# Patient Record
Sex: FEMALE | Race: BLACK OR AFRICAN AMERICAN | NOT HISPANIC OR LATINO | URBAN - METROPOLITAN AREA
[De-identification: names, ages, dates, MRNs, and addresses within clinical notes are randomized per-mention and may not be internally consistent; named-entity substitution may affect disease eponyms.]

---

## 2019-11-03 ENCOUNTER — INPATIENT (INPATIENT)
Facility: HOSPITAL | Age: 64
LOS: 1 days | Discharge: HOME | DRG: 287 | End: 2019-11-05
Attending: INTERNAL MEDICINE | Admitting: INTERNAL MEDICINE
Payer: MEDICAID

## 2019-11-03 VITALS
HEART RATE: 67 BPM | TEMPERATURE: 98 F | RESPIRATION RATE: 16 BRPM | WEIGHT: 182.98 LBS | HEIGHT: 66 IN | OXYGEN SATURATION: 96 % | SYSTOLIC BLOOD PRESSURE: 140 MMHG | DIASTOLIC BLOOD PRESSURE: 82 MMHG

## 2019-11-03 DIAGNOSIS — Z98.890 OTHER SPECIFIED POSTPROCEDURAL STATES: Chronic | ICD-10-CM

## 2019-11-03 DIAGNOSIS — I21.4 NON-ST ELEVATION (NSTEMI) MYOCARDIAL INFARCTION: ICD-10-CM

## 2019-11-03 DIAGNOSIS — Z90.710 ACQUIRED ABSENCE OF BOTH CERVIX AND UTERUS: Chronic | ICD-10-CM

## 2019-11-03 LAB
ALBUMIN SERPL ELPH-MCNC: 4.4 G/DL — SIGNIFICANT CHANGE UP (ref 3.3–5)
ALP SERPL-CCNC: 83 U/L — SIGNIFICANT CHANGE UP (ref 40–120)
ALT FLD-CCNC: 20 U/L — SIGNIFICANT CHANGE UP (ref 10–45)
ANION GAP SERPL CALC-SCNC: 14 MMOL/L — SIGNIFICANT CHANGE UP (ref 5–17)
APTT BLD: 168.1 SEC — CRITICAL HIGH (ref 27.5–36.3)
APTT BLD: 58.9 SEC — HIGH (ref 27.5–36.3)
AST SERPL-CCNC: 24 U/L — SIGNIFICANT CHANGE UP (ref 10–40)
BILIRUB SERPL-MCNC: 0.8 MG/DL — SIGNIFICANT CHANGE UP (ref 0.2–1.2)
BUN SERPL-MCNC: 13 MG/DL — SIGNIFICANT CHANGE UP (ref 7–23)
CALCIUM SERPL-MCNC: 9.6 MG/DL — SIGNIFICANT CHANGE UP (ref 8.4–10.5)
CHLORIDE SERPL-SCNC: 103 MMOL/L — SIGNIFICANT CHANGE UP (ref 96–108)
CK MB BLD-MCNC: 4.2 % — HIGH (ref 0–3.5)
CK MB BLD-MCNC: 4.5 % — HIGH (ref 0–3.5)
CK MB CFR SERPL CALC: 7 NG/ML — HIGH (ref 0–3.8)
CK MB CFR SERPL CALC: 8.4 NG/ML — HIGH (ref 0–3.8)
CK SERPL-CCNC: 168 U/L — SIGNIFICANT CHANGE UP (ref 25–170)
CK SERPL-CCNC: 185 U/L — HIGH (ref 25–170)
CO2 SERPL-SCNC: 26 MMOL/L — SIGNIFICANT CHANGE UP (ref 22–31)
CREAT SERPL-MCNC: 0.81 MG/DL — SIGNIFICANT CHANGE UP (ref 0.5–1.3)
GLUCOSE SERPL-MCNC: 102 MG/DL — HIGH (ref 70–99)
HCT VFR BLD CALC: 43.5 % — SIGNIFICANT CHANGE UP (ref 34.5–45)
HCT VFR BLD CALC: 45.3 % — HIGH (ref 34.5–45)
HGB BLD-MCNC: 14.8 G/DL — SIGNIFICANT CHANGE UP (ref 11.5–15.5)
HGB BLD-MCNC: 15.1 G/DL — SIGNIFICANT CHANGE UP (ref 11.5–15.5)
INR BLD: 1.08 RATIO — SIGNIFICANT CHANGE UP (ref 0.88–1.16)
MCHC RBC-ENTMCNC: 29.9 PG — SIGNIFICANT CHANGE UP (ref 27–34)
MCHC RBC-ENTMCNC: 30.1 PG — SIGNIFICANT CHANGE UP (ref 27–34)
MCHC RBC-ENTMCNC: 33.3 GM/DL — SIGNIFICANT CHANGE UP (ref 32–36)
MCHC RBC-ENTMCNC: 34 GM/DL — SIGNIFICANT CHANGE UP (ref 32–36)
MCV RBC AUTO: 88.4 FL — SIGNIFICANT CHANGE UP (ref 80–100)
MCV RBC AUTO: 89.7 FL — SIGNIFICANT CHANGE UP (ref 80–100)
NRBC # BLD: 0 /100 WBCS — SIGNIFICANT CHANGE UP (ref 0–0)
NRBC # BLD: 0 /100 WBCS — SIGNIFICANT CHANGE UP (ref 0–0)
PLATELET # BLD AUTO: 395 K/UL — SIGNIFICANT CHANGE UP (ref 150–400)
PLATELET # BLD AUTO: 418 K/UL — HIGH (ref 150–400)
POTASSIUM SERPL-MCNC: 3.6 MMOL/L — SIGNIFICANT CHANGE UP (ref 3.5–5.3)
POTASSIUM SERPL-SCNC: 3.6 MMOL/L — SIGNIFICANT CHANGE UP (ref 3.5–5.3)
PROT SERPL-MCNC: 7.8 G/DL — SIGNIFICANT CHANGE UP (ref 6–8.3)
PROTHROM AB SERPL-ACNC: 12.4 SEC — SIGNIFICANT CHANGE UP (ref 10–12.9)
RBC # BLD: 4.92 M/UL — SIGNIFICANT CHANGE UP (ref 3.8–5.2)
RBC # BLD: 5.05 M/UL — SIGNIFICANT CHANGE UP (ref 3.8–5.2)
RBC # FLD: 13.4 % — SIGNIFICANT CHANGE UP (ref 10.3–14.5)
RBC # FLD: 13.5 % — SIGNIFICANT CHANGE UP (ref 10.3–14.5)
SODIUM SERPL-SCNC: 143 MMOL/L — SIGNIFICANT CHANGE UP (ref 135–145)
TROPONIN T, HIGH SENSITIVITY RESULT: 50 NG/L — SIGNIFICANT CHANGE UP (ref 0–51)
TROPONIN T, HIGH SENSITIVITY RESULT: 58 NG/L — HIGH (ref 0–51)
WBC # BLD: 6.72 K/UL — SIGNIFICANT CHANGE UP (ref 3.8–10.5)
WBC # BLD: 8.53 K/UL — SIGNIFICANT CHANGE UP (ref 3.8–10.5)
WBC # FLD AUTO: 6.72 K/UL — SIGNIFICANT CHANGE UP (ref 3.8–10.5)
WBC # FLD AUTO: 8.53 K/UL — SIGNIFICANT CHANGE UP (ref 3.8–10.5)

## 2019-11-03 PROCEDURE — 93010 ELECTROCARDIOGRAM REPORT: CPT

## 2019-11-03 PROCEDURE — 71045 X-RAY EXAM CHEST 1 VIEW: CPT | Mod: 26

## 2019-11-03 PROCEDURE — 70450 CT HEAD/BRAIN W/O DYE: CPT | Mod: 26

## 2019-11-03 PROCEDURE — 99254 IP/OBS CNSLTJ NEW/EST MOD 60: CPT | Mod: GC

## 2019-11-03 PROCEDURE — 99291 CRITICAL CARE FIRST HOUR: CPT

## 2019-11-03 RX ORDER — HEPARIN SODIUM 5000 [USP'U]/ML
INJECTION INTRAVENOUS; SUBCUTANEOUS
Qty: 25000 | Refills: 0 | Status: DISCONTINUED | OUTPATIENT
Start: 2019-11-03 | End: 2019-11-04

## 2019-11-03 RX ORDER — ASPIRIN/CALCIUM CARB/MAGNESIUM 324 MG
81 TABLET ORAL DAILY
Refills: 0 | Status: DISCONTINUED | OUTPATIENT
Start: 2019-11-03 | End: 2019-11-05

## 2019-11-03 RX ORDER — METOPROLOL TARTRATE 50 MG
25 TABLET ORAL
Refills: 0 | Status: DISCONTINUED | OUTPATIENT
Start: 2019-11-03 | End: 2019-11-05

## 2019-11-03 RX ORDER — HEPARIN SODIUM 5000 [USP'U]/ML
4700 INJECTION INTRAVENOUS; SUBCUTANEOUS EVERY 6 HOURS
Refills: 0 | Status: DISCONTINUED | OUTPATIENT
Start: 2019-11-03 | End: 2019-11-04

## 2019-11-03 RX ORDER — ACETAMINOPHEN 500 MG
650 TABLET ORAL EVERY 6 HOURS
Refills: 0 | Status: DISCONTINUED | OUTPATIENT
Start: 2019-11-03 | End: 2019-11-05

## 2019-11-03 RX ORDER — PANTOPRAZOLE SODIUM 20 MG/1
40 TABLET, DELAYED RELEASE ORAL
Refills: 0 | Status: DISCONTINUED | OUTPATIENT
Start: 2019-11-03 | End: 2019-11-05

## 2019-11-03 RX ADMIN — HEPARIN SODIUM 950 UNIT(S)/HR: 5000 INJECTION INTRAVENOUS; SUBCUTANEOUS at 15:06

## 2019-11-03 RX ADMIN — HEPARIN SODIUM 950 UNIT(S)/HR: 5000 INJECTION INTRAVENOUS; SUBCUTANEOUS at 19:38

## 2019-11-03 RX ADMIN — Medication 25 MILLIGRAM(S): at 17:17

## 2019-11-03 NOTE — PROVIDER CONTACT NOTE (OTHER) - SITUATION
Pt. complained of feeling light headed and left eye bleeding. Pt on heparin gtt at 9.5 ml/hr hour- heparin gtt on hold. Pt. denies any chest pain or shortness of breath. Pt with no events on tele.

## 2019-11-03 NOTE — ED PROVIDER NOTE - PMH
Hypertension, unspecified type Hyperlipidemia, unspecified hyperlipidemia type    Hypertension, unspecified type    Varicose veins

## 2019-11-03 NOTE — ED PROVIDER NOTE - PHYSICAL EXAMINATION
GENERAL: NAD  HEENT: PERRL, MMM, no oropharyngeal lesions or erythema appreciated  Pulm: normal work of breathing, CTABL  CV: RRR, S1&S2+, no m/r/g appreciated  ABDOMEN: soft, nt, nd, no hepatosplenomegaly, BS+  MSK: nl ROM  EXTREMITIES:  1+ pitting edema in b/l LE  Neuro: A&Ox3, no focal deficits  SKIN: warm and dry, no visible rash

## 2019-11-03 NOTE — ED PROVIDER NOTE - ATTENDING CONTRIBUTION TO CARE
64F, pmh htn, hld, s/p hysterecomy, presents as transfer from OSH for chest pain. Pt with onset chest pressure yetserday, rad to L arm, seen at [facility] ED, found to have elevated trop, std's on EKG. cards at [facility] consulted, pt given asa, plavix, and strarted on heparin gtt. upon arrival to [facility] pt asymptomatic, denying cp, sob, palpitations, abd pain, or other complaints.    PE: NAD, NCAT, MMM, Trachea midline, Normal conjunctiva, lungs CTAB, S1/S2 RRR, Normal perfusion, 2+ radial pulses bilat, Abdomen Soft, NTND, No rebound/guarding, No LE edema, No deformity of extremities, No rashes,  No focal motor or sensory deficits.     EKG reveals std's as noted concerning for ischemia. Without s/s of PE, without sx suggestive of aortic pathology. Cards consulted on arrival. To send labs includign repeat trop. To be admitted to tele. - Manolo Jin MD

## 2019-11-03 NOTE — ED PROVIDER NOTE - OBJECTIVE STATEMENT
63 y/o woman with pmhx HTN, HLD, fibroids s/p hysterectomy transferred from OSH for chest pain. Last night, chest pain (pressure-like) began with radiation to the left arm, associated with activity (styling sister's hair.) Has SOB with exertion. Arrived at [facility] ED this morning, s/p  and SLN x1 with resolution of symptoms, trop was 0.04. loaded with Plavix 600 and heparin prior to transfer to [facility]. Here, patient asymptomatic, denies chest pain, SOB, orthopnea, fever, chills, edema, palpitations. Reportedly she had a stress test 3/2019 in [facility] ([facility]) and told she had blockages, but there was no intervention. Repeat troponin 50, suspect NSTEMI. Cardiology planning for cath 11/4/19.

## 2019-11-03 NOTE — ED ADULT NURSE NOTE - NSIMPLEMENTINTERV_GEN_ALL_ED
Implemented All Fall with Harm Risk Interventions:  [place] to call system. Call bell, personal items and telephone within reach. Instruct patient to call for assistance. Room bathroom lighting operational. Non-slip footwear when patient is off stretcher. Physically safe environment: no spills, clutter or unnecessary equipment. Stretcher in lowest position, wheels locked, appropriate side rails in place. Provide visual cue, wrist band, yellow gown, etc. Monitor gait and stability. Monitor for mental status changes and reorient to person, place, and time. Review medications for side effects contributing to fall risk. Reinforce activity limits and safety measures with patient and family. Provide visual clues: red socks.

## 2019-11-03 NOTE — CONSULT NOTE ADULT - SUBJECTIVE AND OBJECTIVE BOX
Patient seen and evaluated at bedside    Chief Complaint:    HPI:  64F hx HTN, HLD, and cardiomegaly (per verbal report) states having chest pain since midnight  Notes she was doing her sisters hair at midnight and felt central chest pressure radiating to left arm. Associated with SOB. Coming and going. Presented to [facility] ED this AM.  Given  and SLN x1 with resolution of Symptoms. Trop I was 0.04 (mildly positive). I gave permission to load with Plavix 600 and heparin prior to transfer.  upon arrival symptoms gone. She never had before. States SOB is only with CP, denies orthopnea, fever, chills, edema, palpitations.  Notes she had a stress in March in [place] and told she had blockages, but nothing was done?    Of note, 'cardiomegaly' history started 2 years ago after death of both her sons due to non-medical reasons (motor accident). Denies prior cardiac workup.    PMHx:   HTN  HLD  cardiomegaly    PSHx:   vein stripping  hernia    Allergies:  penicillin (Unknown)      Home Meds:  Amlodipine  prindopril  indipamide      FAMILY HISTORY:  Denied      Social History:  Smoking History: Denied  Alcohol Use: Denied  Drug Use: Denied    REVIEW OF SYSTEMS:  CONSTITUTIONAL: No weakness, fevers or chills  EYES/ENT: No visual changes;  No dysphagia  NECK: No pain or stiffness  RESPIRATORY: as hpi  CARDIOVASCULAR: as above  GASTROINTESTINAL: No abdominal or epigastric pain. No nausea, vomiting, or hematemesis; No diarrhea or constipation. No melena or hematochezia.  BACK: No back pain  GENITOURINARY: No dysuria, frequency or hematuria  NEUROLOGICAL: No numbness or weakness  SKIN: No itching, burning, rashes, or lesions   All other review of systems is negative unless indicated above.    Physical Exam:  T(F): 98 (11-03), Max: 98 (11-03)  HR: 74 (11-03) (67 - 74)  BP: 116/91 (11-03) (116/91 - 140/82)  RR: 16 (11-03)  SpO2: 98% (11-03)  GENERAL: No acute distress, well-developed  HEAD:  Atraumatic, Normocephalic  ENT: EOMI, PERRLA, conjunctiva and sclera clear, Neck supple, No JVD, moist mucosa  CHEST/LUNG: Clear to auscultation bilaterally; No wheeze, equal breath sounds bilaterally   BACK: No spinal tenderness  HEART: Regular rate and rhythm; No murmurs, rubs, or gallops  ABDOMEN: Soft, Nontender, Nondistended; Bowel sounds present  EXTREMITIES:  No clubbing, cyanosis, or edema  PSYCH: Nl behavior, nl affect  NEUROLOGY: AAOx3, non-focal, cranial nerves intact  SKIN: Normal color, No rashes or lesions    Cardiovascular Diagnostic Testing:    ECG: Personally reviewed:  LVH with 1mm STD in lateral lead (likely repol, cannot rule out ischemia)    Labs: Personally reviewed                        15.1   6.72  )-----------( 418      ( 03 Nov 2019 13:05 )             45.3 64F hx HTN, HLD, and cardiomegaly (per verbal report); states having chest pain since midnight  Notes she was doing her sisters hair at midnight and felt central chest pressure radiating to left arm. Associated with SOB.  Sxs. waxed and waned; presented to [facility] ED this AM.  She was given  and SLN x1 with resolution of symptoms. Trop I was 0.04 (mildly positive). I gave permission to load with Plavix 600 and heparin prior to transfer here.  Upon arrival to [facility], symptoms had resolved.  She never had similar sxs. before. States SOB is only with CP, denies orthopnea, fever, chills, edema, palpitations.  Notes she had a stress in March in [place] and told she had blockages, but nothing was done?    Of note, 'cardiomegaly' history started 2 years ago after death of both her sons, due to non-medical reasons (motor accident). Denies prior cardiac workup.      PMHx:   HTN  HLD  cardiomegaly    PSHx:   vein stripping  hernia    Allergies:  penicillin (Unknown)    Home Meds:  amlodipine  prindopril  indipamide      FAMILY HISTORY:  No family hx of heart disease    Social History:  Smoking History: Denied  Alcohol Use: Denied  Drug Use: Denied    REVIEW OF SYSTEMS:  CONSTITUTIONAL: No weakness, fevers or chills  EYES/ENT: No visual changes;  No dysphagia  NECK: No pain or stiffness  RESPIRATORY: as hpi  CARDIOVASCULAR: as above  GASTROINTESTINAL: No abdominal or epigastric pain. No nausea, vomiting, or hematemesis; No diarrhea or constipation. No melena or hematochezia.  BACK: No back pain  GENITOURINARY: No dysuria, frequency or hematuria  NEUROLOGICAL: No numbness or weakness  SKIN: No itching, burning, rashes, or lesions   All other review of systems is negative unless indicated above.    Physical Exam:  T(F): 98 (11-03), Max: 98 (11-03)  HR: 74 (11-03) (67 - 74)  BP: 116/91 (11-03) (116/91 - 140/82)  RR: 16 (11-03)  SpO2: 98% (11-03)    GENERAL: No acute distress, well-developed  HEAD:  Atraumatic, Normocephalic  ENT: EOMI, PERRLA, conjunctiva and sclera clear, Neck supple, No JVD, moist mucosa  CHEST/LUNG: Clear to auscultation bilaterally; No wheeze, equal breath sounds bilaterally   BACK: No spinal tenderness  HEART: Regular rate and rhythm; No murmurs, rubs, or gallops  ABDOMEN: Soft, Nontender, Nondistended; Bowel sounds present  EXTREMITIES:  No clubbing, cyanosis, or edema  PSYCH: Nl behavior, nl affect  NEUROLOGY: AAOx3, non-focal, cranial nerves intact  SKIN: Normal color, No rashes or lesions      ECG:  LVH with 1mm STD in lateral lead (likely repol, cannot rule out ischemia)    Labs:                       15.1   6.72  )-----------( 418      ( 03 Nov 2019 13:05 )             45.3

## 2019-11-03 NOTE — ED PROVIDER NOTE - CARE PLAN
Principal Discharge DX:	NSTEMI (non-ST elevated myocardial infarction)  Goal:	Treatment, evaluation  Assessment and plan of treatment:	Pt with t-wave inversions on EKG V3-V6, trop 50<<0.04, CKMB 8.4, cardiology reccs non-emergent cath tomorrow 11/4 AM. Admit to telemetry for monitoring.

## 2019-11-03 NOTE — PROVIDER CONTACT NOTE (OTHER) - ACTION/TREATMENT ORDERED:
NP made aware- as per NP- hold heparin gtt, draw STAT CBC and PTT. STAT CT of the head ordered. Continue to monitor pt.

## 2019-11-03 NOTE — ED ADULT NURSE NOTE - OBJECTIVE STATEMENT
64 year old female BIBEMS from [facility] for chest pain and cardiology. Pt was given 325 ASA ad 0922, brillinta 180 at 1102, one dose of nitro at 0922, heparin 5,000 IV bolus at 11:04 and iv DOSE OF 1,000 UNITS an hour started at 1105. Heparin discontinued on arrival at 1212 as per MD Maier and to be reordered based on measured weight. Pt is A&O x 4, VSS, afebrile, ambulating independently. Pt denies fever, chills, NVD. Pt has 20G IV in right AC from [facility], absent of SS of infiltration. Pt denies CP on arrival, EKG completed and given to MD Maier, Pt placed on CM. pt resting comfortably. EKG via EMS showing T wave inversions and minor depressions. Pt has Hc of enlarged heart and HTN.  Pt trope at [facility] was .045 64 year old female BIBEMS from [facility] for chest pain and cardiology. Pt was given 325 ASA ad 0922, brillinta 180 at 1102, one dose of nitro at 0922, heparin 5,000 IV bolus at 11:04 and iv DOSE OF 1,000 UNITS an hour started at 1105. Heparin discontinued on arrival at 1212 as per MD Maier and to be reordered based on measured weight. Pt is A&O x 4, VSS, afebrile, ambulating independently. Pt denies fever, chills, NVD. Pt has 20G IV in right AC from [facility], absent of SS of infiltration. Pt denies CP on arrival, EKG completed and given to MD Maier, Pt placed on CM. pt resting comfortably. EKG via EMS showing T wave inversions and minor depressions. Pt has Hc of enlarged heart and HTN.  Pt trop at [facility] was .045

## 2019-11-03 NOTE — ED PROVIDER NOTE - PROGRESS NOTE DETAILS
Cardiology reccs appreciated, pending non-emergent cardiac cath for 11/4/19. Admit to telemetry for monitoring.

## 2019-11-03 NOTE — H&P ADULT - NSHPSOCIALHISTORY_GEN_ALL_CORE
Social History:    Marital Status:  (  x )    (   ) Single    (   )    (  )   Occupation:   Lives with: (  ) alone  (  ) children   ( x ) spouse   (  ) parents  (  ) other    Substance Use (street drugs): ( x ) never used  (  ) other:  Tobacco Usage:  ( x  ) never smoked   (   ) former smoker   (   ) current smoker  (     ) pack years  (        ) last cigarette date  Alcohol Usage: denies    (     ) Advanced Directives: (     ) None    (      ) DNR    (     ) DNI    (     ) Health Care Proxy:

## 2019-11-03 NOTE — H&P ADULT - NSHPREVIEWOFSYSTEMS_GEN_ALL_CORE
REVIEW OF SYSTEMS:    CONSTITUTIONAL: No weakness, fevers or chills  EYES/ENT: No visual changes;  No vertigo or throat pain   NECK: No pain or stiffness  RESPIRATORY: No cough, wheezing, hemoptysis; + shortness of breath  CARDIOVASCULAR: + chest pain or palpitations  GASTROINTESTINAL: No abdominal or epigastric pain. No nausea, vomiting, or hematemesis; No diarrhea or constipation. No melena or hematochezia.  GENITOURINARY: No dysuria, frequency or hematuria  NEUROLOGICAL: No numbness or weakness  SKIN: No itching, burning, rashes, or lesions   All other review of systems is negative unless indicated above.

## 2019-11-03 NOTE — H&P ADULT - NSHPPHYSICALEXAM_GEN_ALL_CORE
PHYSICAL EXAMINATION:  Vital Signs Last 24 Hrs  T(C): 36.7 (03 Nov 2019 12:45), Max: 36.7 (03 Nov 2019 12:45)  T(F): 98 (03 Nov 2019 12:45), Max: 98 (03 Nov 2019 12:45)  HR: 74 (03 Nov 2019 12:45) (67 - 74)  BP: 116/91 (03 Nov 2019 12:45) (116/91 - 140/82)  BP(mean): --  RR: 16 (03 Nov 2019 12:45) (16 - 16)  SpO2: 98% (03 Nov 2019 12:45) (96% - 98%)  CAPILLARY BLOOD GLUCOSE          GENERAL: NAD, well-groomed, well-developed  HEAD:  atraumatic, normocephalic  EYES: sclera anicteric  ENMT: mucous membranes moist  NECK: supple, No JVD  CHEST/LUNG: clear to auscultation bilaterally; no rales, rhonchi, or wheezing b/l  HEART: normal S1, S2  ABDOMEN: BS+, soft, ND, NT   EXTREMITIES:  pulses palpable; no clubbing, cyanosis, or edema b/l LEs  NEURO: awake, alert, interactive; moves all extremities  SKIN: no rashes or lesions

## 2019-11-03 NOTE — PROVIDER CONTACT NOTE (OTHER) - BACKGROUND
Pt. admitted for chest pain, and shortness of breath. Pt SR on tele. Pt. awaiting TTE and cardiac cath.

## 2019-11-03 NOTE — CONSULT NOTE ADULT - ASSESSMENT
64F hx HTN, HLD, non-smoker and cardiomegaly (per verbal report) states having chest pain since midnight    #UA vs NSTEMI: Trop I was 0.04, would repeat hsTrop while here.   -hsTrop trend x2, with CKMB, BNP  -TTE given cardiomegaly history  - given, continue ASA 81  -If hsTrop negative, no need to continue plavix/hep  -If hsTrop is positive, please continue daily plavix and heparin gtt  -Would recommend non-urgent LHC on Monday  -No signs of CHF, arrhythmia, or active ischemia (other than mild trop)    Discussed with Dr. Saldivar    For all cardiology related questions, please call the current cardiology fellow on service at this time.  ** Please go to [email or web address] ; login: [email or web address] (case-sensitive) **    Ramakrishna Urena MD  Department of Cardiovascular Disease 64F hx HTN, HLD, non-smoker and cardiomegaly (per verbal report) states having chest pain since midnight    #UA vs NSTEMI: Trop I was 0.04, would repeat hsTrop while here. STEPHEN Score 89  -hsTrop trend x2, with CKMB, BNP (CK elevated, check CKMB)  -TTE given cardiomegaly history  - given, continue ASA 81  -First hsTrop 50, please repeat in 3 hours. Okay to continue Plavix/heparin for now given symptoms may be due to unstable angina.  -Would recommend non-urgent LHC on Monday  -No signs of CHF, arrhythmia, or active ischemia (other than mild trop)    Discussed with Dr. Saldivar    For all cardiology related questions, please call the current cardiology fellow on service at this time.  ** Please go to [email or web address] ; login: [email or web address] (case-sensitive) **    Ramakrishna Urena MD  Department of Cardiovascular Disease 64F hx HTN, HLD, non-smoker and cardiomegaly (per verbal report) states having chest pain since midnight    #UA vs NSTEMI: Trop I was 0.04, would repeat hsTrop while here. STEPHEN Score 89  -hsTrop trend x2, with CKMB, BNP (CK elevated, check CKMB)  -TTE given cardiomegaly history  - given, continue ASA 81  -First hsTrop 50, please repeat in 3 hours. If rising, okay to continue Plavix/heparin. Otherwise, mild trop may be due to HTN and LVH (BP was in 160s in [facility] per signout) (please control BP)  -Would recommend non-urgent LHC on Monday  -No signs of CHF, arrhythmia, or active ischemia (other than mild trop)  -Start metop PO if BP remains elevated    Discussed with Dr. Saldivar    For all cardiology related questions, please call the current cardiology fellow on service at this time.  ** Please go to [email or web address] ; login: [email or web address] (case-sensitive) **    Ramakrishna Urena MD  Department of Cardiovascular Disease 64F hx HTN, HLD, non-smoker and cardiomegaly (per verbal report), hx. of abnormal stress test, presents with complain ot CP since midnight.      REC:  #UA vs NSTEMI: Trop I was 0.04, would repeat hsTrop while here. STEPHEN Score 89  -hsTrop trend x2, with CKMB, BNP (CK elevated, check CKMB)  -TTE given cardiomegaly history  - given, continue ASA 81  -First hsTrop 50, please repeat in 3 hours. If rising, okay to continue Plavix/heparin. Otherwise, mild trop may be due to HTN and LVH (BP was in 160s in [facility] per signout) (please control BP)  -Recommend non-urgent LHC on Monday  -No signs of CHF, arrhythmia, or active ischemia (other than mild trop)  -Start metop PO if BP remains elevated    Discussed with Dr. Yariel Urena MD  Cardiology fellow  Department of Cardiovascular Disease    Raudel Hoover M.D.  Cardiology Attending, Consult Service  [phone] or beeper     For all Cardiology service contact information, go to [email or web address] and use "[email or web address]" to login.

## 2019-11-03 NOTE — CHART NOTE - NSCHARTNOTEFT_GEN_A_CORE
Called by RN to see pt for dizziness/ bleeding by eye. 63 y/o woman with pmhx HTN, HLD, fibroids s/p hysterectomy transferred from OSH for chest pain. Given , loaded with Plavix and SLN x1 with resolution of Symptoms. Trop was  (mildly positive) Admitted with CP-NSTEMI 11/3 , likely plan for cath in am. pt seen and examined.     Vital Signs Last 24 Hrs  T(C): 37 (03 Nov 2019 17:13), Max: 37 (03 Nov 2019 17:13)  T(F): 98.6 (03 Nov 2019 17:13), Max: 98.6 (03 Nov 2019 17:13)  HR: 64 (03 Nov 2019 17:13) (64 - 74)  BP: 150/85 (03 Nov 2019 17:13) (116/91 - 157/89)  BP(mean): --  RR: 18 (03 Nov 2019 17:13) (16 - 18)  SpO2: 94% (03 Nov 2019 17:13) (94% - 98%)                        15.1   6.72  )-----------( 418      ( 03 Nov 2019 13:05 )             45.3     11-03    143  |  103  |  13  ----------------------------<  102<H>  3.6   |  26  |  0.81    Ca    9.6      03 Nov 2019 13:05    TPro  7.8  /  Alb  4.4  /  TBili  0.8  /  DBili  x   /  AST  24  /  ALT  20  /  AlkPhos  83  11-03    PT/INR - ( 03 Nov 2019 13:05 )   PT: 12.4 sec;   INR: 1.08 ratio         PTT - ( 03 Nov 2019 13:05 )  PTT:168.1 sec    General: WN/WD NAD  Neurology: A&Ox3, nonfocal, DEL CID x 4  Head:  Normocephalic, atraumatic  eyes- negative bleeding in eyes b/l, + scratch by left eye lid minor bleeding  Respiratory: CTA B/L  CV: RRR, S1S2, no murmur  Abdominal: Soft, NT, ND no palpable mass  MSK: No edema, + peripheral pulses, FROM all 4 extremity    A/P  ACS  On heparin gtt, possibly supra theraputic  Hold heparin gtt for now  check stat labs  given dizziness will also check CT head to r/o bleed  F/U results  D/W Dr Cruz Called by RN to see pt for dizziness and " bleeding by eye". 63 y/o woman with pmhx HTN, HLD, fibroids s/p hysterectomy transferred from OSH for chest pain. Given , loaded with Plavix and SLN x1 with resolution of Symptoms. Trop was  (mildly positive) Admitted with CP-NSTEMI 11/3 , likely plan for cath in am. pt seen and examined.     Vital Signs Last 24 Hrs  T(C): 37 (03 Nov 2019 17:13), Max: 37 (03 Nov 2019 17:13)  T(F): 98.6 (03 Nov 2019 17:13), Max: 98.6 (03 Nov 2019 17:13)  HR: 64 (03 Nov 2019 17:13) (64 - 74)  BP: 150/85 (03 Nov 2019 17:13) (116/91 - 157/89)  BP(mean): --  RR: 18 (03 Nov 2019 17:13) (16 - 18)  SpO2: 94% (03 Nov 2019 17:13) (94% - 98%)                        15.1   6.72  )-----------( 418      ( 03 Nov 2019 13:05 )             45.3     11-03    143  |  103  |  13  ----------------------------<  102<H>  3.6   |  26  |  0.81    Ca    9.6      03 Nov 2019 13:05    TPro  7.8  /  Alb  4.4  /  TBili  0.8  /  DBili  x   /  AST  24  /  ALT  20  /  AlkPhos  83  11-03    PT/INR - ( 03 Nov 2019 13:05 )   PT: 12.4 sec;   INR: 1.08 ratio         PTT - ( 03 Nov 2019 13:05 )  PTT:168.1 sec    General: WN/WD NAD  Neurology: A&Ox3, nonfocal, DEL CID x 4  Head:  Normocephalic, atraumatic  eyes- negative bleeding in eyes, + scratch by inner canthus of left eye + minor bleeding  Respiratory: CTA B/L  CV: RRR, S1S2, no murmur  Abdominal: Soft, NT, ND no palpable mass  MSK: No edema, + peripheral pulses, FROM all 4 extremity    A/P  ACS  On heparin gtt, possibly supra theraputic  Hold heparin gtt for now  check stat labs  given dizziness will also check CT head to r/o bleed  F/U results  D/W Dr Cruz

## 2019-11-03 NOTE — H&P ADULT - ASSESSMENT
64 f with    Chest pain/ R/O MI  - telemetry  - cardiac enzymes  - ASA, BB  - AC with Heparin gtt  - cardiology evaluation    HTN control    DVT/ GI prophylaxis    Further action as per clinical course   Kamlesh Cruz MD pager [phone]

## 2019-11-03 NOTE — ED PROVIDER NOTE - CLINICAL SUMMARY MEDICAL DECISION MAKING FREE TEXT BOX
Pt with t-wave inversions on EKG V3-V6, trop 50<<0.04, CKMB 8.4, cardiology reccs non-emergent cath tomorrow 11/4 AM. Trend troponin/CKMB next due at 4pm, ordered. Admit to telemetry for monitoring. Pt with t-wave inversions on EKG V3-V6, trop elevated, CKMB 8.4, cardiology reccs non-emergent cath tomorrow 11/4 AM. Trend troponin/CKMB next due at 4pm, ordered. Admit to telemetry for monitoring.

## 2019-11-03 NOTE — H&P ADULT - HISTORY OF PRESENT ILLNESS
63 y/o woman with pmhx HTN, HLD, fibroids s/p hysterectomy transferred from OSH for chest pain. Last night, chest pain (pressure-like) began with radiation to the left arm, associated with activity (styling sister's hair.) Has SOB with exertion. Arrived at [facility] ED this morning, s/p  and SLN x1 with resolution of symptoms, trop was 0.04. loaded with Plavix 600 and heparin prior to transfer to [facility]. Here, patient asymptomatic, denies chest pain, SOB, orthopnea, fever, chills, edema, palpitations. Reportedly she had a stress test 3/2019 in [facility] ([facility]) and told she had blockages, but there was no intervention. Repeat troponin 50, suspect NSTEMI. Cardiology planning for cath 11/4/19.

## 2019-11-03 NOTE — H&P ADULT - NSHPLABSRESULTS_GEN_ALL_CORE
15.1   6.72  )-----------( 418      ( 03 Nov 2019 13:05 )             45.3       11-03    143  |  103  |  13  ----------------------------<  102<H>  3.6   |  26  |  0.81    Ca    9.6      03 Nov 2019 13:05    TPro  7.8  /  Alb  4.4  /  TBili  0.8  /  DBili  x   /  AST  24  /  ALT  20  /  AlkPhos  83  11-03                  PT/INR - ( 03 Nov 2019 13:05 )   PT: 12.4 sec;   INR: 1.08 ratio         PTT - ( 03 Nov 2019 13:05 )  PTT:168.1 sec    Lactate Trend      CARDIAC MARKERS ( 03 Nov 2019 13:05 )  x     / x     / 185 U/L / x     / 8.4 ng/mL        CAPILLARY BLOOD GLUCOSE        EKG SR T inv inf/ ant leads  < from: Xray Chest 1 View- PORTABLE-Urgent (11.03.19 @ 13:20) >    IMPRESSION:    Clear lungs. No pleural effusion or pneumothorax.    < end of copied text >

## 2019-11-03 NOTE — ED PROVIDER NOTE - PLAN OF CARE
Treatment, evaluation Pt with t-wave inversions on EKG V3-V6, trop 50<<0.04, CKMB 8.4, cardiology reccs non-emergent cath tomorrow 11/4 AM. Admit to telemetry for monitoring.

## 2019-11-03 NOTE — H&P ADULT - NSICDXPASTMEDICALHX_GEN_ALL_CORE_FT
PAST MEDICAL HISTORY:  Hyperlipidemia, unspecified hyperlipidemia type     Hypertension, unspecified type     Varicose veins

## 2019-11-04 LAB
ANION GAP SERPL CALC-SCNC: 13 MMOL/L — SIGNIFICANT CHANGE UP (ref 5–17)
APTT BLD: 62.4 SEC — HIGH (ref 27.5–36.3)
BUN SERPL-MCNC: 14 MG/DL — SIGNIFICANT CHANGE UP (ref 7–23)
CALCIUM SERPL-MCNC: 9.2 MG/DL — SIGNIFICANT CHANGE UP (ref 8.4–10.5)
CHLORIDE SERPL-SCNC: 102 MMOL/L — SIGNIFICANT CHANGE UP (ref 96–108)
CO2 SERPL-SCNC: 25 MMOL/L — SIGNIFICANT CHANGE UP (ref 22–31)
CREAT SERPL-MCNC: 0.88 MG/DL — SIGNIFICANT CHANGE UP (ref 0.5–1.3)
GLUCOSE SERPL-MCNC: 93 MG/DL — SIGNIFICANT CHANGE UP (ref 70–99)
HCT VFR BLD CALC: 41.3 % — SIGNIFICANT CHANGE UP (ref 34.5–45)
HCT VFR BLD CALC: 42 % — SIGNIFICANT CHANGE UP (ref 34.5–45)
HCV AB S/CO SERPL IA: 0.13 S/CO — SIGNIFICANT CHANGE UP (ref 0–0.99)
HCV AB SERPL-IMP: SIGNIFICANT CHANGE UP
HGB BLD-MCNC: 13.7 G/DL — SIGNIFICANT CHANGE UP (ref 11.5–15.5)
HGB BLD-MCNC: 14 G/DL — SIGNIFICANT CHANGE UP (ref 11.5–15.5)
MCHC RBC-ENTMCNC: 30 PG — SIGNIFICANT CHANGE UP (ref 27–34)
MCHC RBC-ENTMCNC: 30.5 PG — SIGNIFICANT CHANGE UP (ref 27–34)
MCHC RBC-ENTMCNC: 32.6 GM/DL — SIGNIFICANT CHANGE UP (ref 32–36)
MCHC RBC-ENTMCNC: 33.9 GM/DL — SIGNIFICANT CHANGE UP (ref 32–36)
MCV RBC AUTO: 90 FL — SIGNIFICANT CHANGE UP (ref 80–100)
MCV RBC AUTO: 92.1 FL — SIGNIFICANT CHANGE UP (ref 80–100)
NRBC # BLD: 0 /100 WBCS — SIGNIFICANT CHANGE UP (ref 0–0)
PLATELET # BLD AUTO: 353 K/UL — SIGNIFICANT CHANGE UP (ref 150–400)
PLATELET # BLD AUTO: 376 K/UL — SIGNIFICANT CHANGE UP (ref 150–400)
POTASSIUM SERPL-MCNC: 3.5 MMOL/L — SIGNIFICANT CHANGE UP (ref 3.5–5.3)
POTASSIUM SERPL-SCNC: 3.5 MMOL/L — SIGNIFICANT CHANGE UP (ref 3.5–5.3)
RBC # BLD: 4.56 M/UL — SIGNIFICANT CHANGE UP (ref 3.8–5.2)
RBC # BLD: 4.59 M/UL — SIGNIFICANT CHANGE UP (ref 3.8–5.2)
RBC # FLD: 13.6 % — SIGNIFICANT CHANGE UP (ref 10.3–14.5)
RBC # FLD: 13.6 % — SIGNIFICANT CHANGE UP (ref 10.3–14.5)
SODIUM SERPL-SCNC: 140 MMOL/L — SIGNIFICANT CHANGE UP (ref 135–145)
TROPONIN T, HIGH SENSITIVITY RESULT: 50 NG/L — SIGNIFICANT CHANGE UP (ref 0–51)
WBC # BLD: 7.07 K/UL — SIGNIFICANT CHANGE UP (ref 3.8–10.5)
WBC # BLD: 8.51 K/UL — SIGNIFICANT CHANGE UP (ref 3.8–10.5)
WBC # FLD AUTO: 7.07 K/UL — SIGNIFICANT CHANGE UP (ref 3.8–10.5)
WBC # FLD AUTO: 8.51 K/UL — SIGNIFICANT CHANGE UP (ref 3.8–10.5)

## 2019-11-04 PROCEDURE — 93458 L HRT ARTERY/VENTRICLE ANGIO: CPT | Mod: 26,GC

## 2019-11-04 PROCEDURE — 99233 SBSQ HOSP IP/OBS HIGH 50: CPT | Mod: GC

## 2019-11-04 RX ORDER — INFLUENZA VIRUS VACCINE 15; 15; 15; 15 UG/.5ML; UG/.5ML; UG/.5ML; UG/.5ML
0.5 SUSPENSION INTRAMUSCULAR ONCE
Refills: 0 | Status: COMPLETED | OUTPATIENT
Start: 2019-11-04 | End: 2019-11-05

## 2019-11-04 RX ADMIN — Medication 25 MILLIGRAM(S): at 05:30

## 2019-11-04 RX ADMIN — PANTOPRAZOLE SODIUM 40 MILLIGRAM(S): 20 TABLET, DELAYED RELEASE ORAL at 05:30

## 2019-11-04 RX ADMIN — Medication 81 MILLIGRAM(S): at 11:28

## 2019-11-04 RX ADMIN — HEPARIN SODIUM 950 UNIT(S)/HR: 5000 INJECTION INTRAVENOUS; SUBCUTANEOUS at 02:22

## 2019-11-04 RX ADMIN — Medication 25 MILLIGRAM(S): at 18:24

## 2019-11-04 NOTE — PROGRESS NOTE ADULT - SUBJECTIVE AND OBJECTIVE BOX
Patient is a 64y old  Female who presents with a chief complaint of chest pain (04 Nov 2019 10:26)      SUBJECTIVE / OVERNIGHT EVENTS: Comfortable without new complaints.   Review of Systems  chest pain no  palpitations no  sob no  nausea no  headache no    MEDICATIONS  (STANDING):  aspirin  chewable 81 milliGRAM(s) Oral daily  influenza   Vaccine 0.5 milliLiter(s) IntraMuscular once  metoprolol tartrate 25 milliGRAM(s) Oral two times a day  pantoprazole    Tablet 40 milliGRAM(s) Oral before breakfast    MEDICATIONS  (PRN):  acetaminophen   Tablet .. 650 milliGRAM(s) Oral every 6 hours PRN Temp greater or equal to 38.5C (101.3F), Mild Pain (1 - 3)      Vital Signs Last 24 Hrs  T(C): 36.6 (04 Nov 2019 18:17), Max: 37.2 (03 Nov 2019 20:22)  T(F): 97.9 (04 Nov 2019 18:17), Max: 98.9 (03 Nov 2019 20:22)  HR: 74 (04 Nov 2019 18:17) (63 - 74)  BP: 133/78 (04 Nov 2019 18:17) (113/71 - 153/93)  BP(mean): --  RR: 18 (04 Nov 2019 18:17) (18 - 18)  SpO2: 96% (04 Nov 2019 18:17) (96% - 100%)    PHYSICAL EXAM:  GENERAL: NAD, well-developed  HEAD:  Atraumatic, Normocephalic  EYES: EOMI, PERRLA, conjunctiva and sclera clear  NECK: Supple, No JVD  CHEST/LUNG: Clear to auscultation bilaterally; No wheeze  HEART: Regular rate and rhythm; No murmurs, rubs, or gallops  ABDOMEN: Soft, Nontender, Nondistended; Bowel sounds present  EXTREMITIES:  2+ Peripheral Pulses, No clubbing, cyanosis, or edema  PSYCH: AAOx3  NEUROLOGY: non-focal  SKIN: No rashes or lesions    LABS:                        13.7   7.07  )-----------( 376      ( 04 Nov 2019 08:05 )             42.0     11-04    140  |  102  |  14  ----------------------------<  93  3.5   |  25  |  0.88    Ca    9.2      04 Nov 2019 06:55    TPro  7.8  /  Alb  4.4  /  TBili  0.8  /  DBili  x   /  AST  24  /  ALT  20  /  AlkPhos  83  11-03    PT/INR - ( 03 Nov 2019 13:05 )   PT: 12.4 sec;   INR: 1.08 ratio         PTT - ( 04 Nov 2019 01:27 )  PTT:62.4 sec  CARDIAC MARKERS ( 03 Nov 2019 16:53 )  x     / x     / 168 U/L / x     / 7.0 ng/mL  CARDIAC MARKERS ( 03 Nov 2019 13:05 )  x     / x     / 185 U/L / x     / 8.4 ng/mL            RADIOLOGY & ADDITIONAL TESTS:    Imaging Personally Reviewed:    Consultant(s) Notes Reviewed:      Care Discussed with Consultants/Other Providers:

## 2019-11-04 NOTE — PROGRESS NOTE ADULT - SUBJECTIVE AND OBJECTIVE BOX
Patient seen and examined at bedside.    Overnight Events:   no events overnight. Feels well this AM.  no more CP. denies palpitations, SOB, orthopnea.      REVIEW OF SYSTEMS:  Constitutional:     [x ] negative [ ] fevers [ ] chills [ ] weight loss [ ] weight gain  HEENT:                  [x ] negative [ ] dry eyes [ ] eye irritation [ ] postnasal drip [ ] nasal congestion  CV:                         [ x] negative  [ ] chest pain [ ] orthopnea [ ] palpitations [ ] murmur  Resp:                     [ x] negative [ ] cough [ ] shortness of breath [ ] dyspnea [ ] wheezing [ ] sputum [ ]hemoptysis  GI:                          [ x] negative [ ] nausea [ ] vomiting [ ] diarrhea [ ] constipation [ ] abd pain [ ] dysphagia   :                        [ x] negative [ ] dysuria [ ] nocturia [ ] hematuria [ ] increased urinary frequency  Musculoskeletal: [ x] negative [ ] back pain [ ] myalgias [ ] arthralgias [ ] fracture  Skin:                       [ x] negative [ ] rash [ ] itch  Neurological:        [x ] negative [ ] headache [ ] dizziness [ ] syncope [ ] weakness [ ] numbness  Psychiatric:           [ x] negative [ ] anxiety [ ] depression  Endocrine:            [ x] negative [ ] diabetes [ ] thyroid problem  Heme/Lymph:      [ x] negative [ ] anemia [ ] bleeding problem  Allergic/Immune: [ x] negative [ ] itchy eyes [ ] nasal discharge [ ] hives [ ] angioedema    [ x] All other systems negative  [ ] Unable to assess ROS due to    Current Meds:  acetaminophen   Tablet .. 650 milliGRAM(s) Oral every 6 hours PRN  aspirin  chewable 81 milliGRAM(s) Oral daily  heparin  Infusion.  Unit(s)/Hr IV Continuous <Continuous>  heparin  Injectable 4700 Unit(s) IV Push every 6 hours PRN  influenza   Vaccine 0.5 milliLiter(s) IntraMuscular once  metoprolol tartrate 25 milliGRAM(s) Oral two times a day  pantoprazole    Tablet 40 milliGRAM(s) Oral before breakfast      PAST MEDICAL & SURGICAL HISTORY:  Varicose veins  Hyperlipidemia, unspecified hyperlipidemia type  Hypertension, unspecified type  H/O vascular surgery  H/O abdominal hysterectomy      Vitals:  T(F): 98.1 (11-04), Max: 98.9 (11-03)  HR: 67 (11-04) (63 - 74)  BP: 129/72 (11-04) (113/71 - 174/90)  RR: 18 (11-04)  SpO2: 100% (11-04)  I&O's Summary    03 Nov 2019 07:01  -  04 Nov 2019 07:00  --------------------------------------------------------  IN: 114 mL / OUT: 0 mL / NET: 114 mL    04 Nov 2019 07:01  -  04 Nov 2019 10:26  --------------------------------------------------------  IN: 100 mL / OUT: 1 mL / NET: 99 mL        Physical Exam:  Appearance: No acute distress; well appearing  Eyes: PERRL, EOMI, pink conjunctiva  HENT: Normal oral mucosa  Cardiovascular: RRR, S1, S2, no murmurs, rubs, or gallops; no edema; no JVD  Respiratory: Clear to auscultation bilaterally  Gastrointestinal: soft, non-tender, non-distended with normal bowel sounds  Musculoskeletal: No clubbing; no joint deformity   Neurologic: Non-focal  Lymphatic: No lymphadenopathy  Psychiatry: AAOx3, mood & affect appropriate  Skin: No rashes, ecchymoses, or cyanosis                          13.7   7.07  )-----------( 376      ( 04 Nov 2019 08:05 )             42.0     11-04    140  |  102  |  14  ----------------------------<  93  3.5   |  25  |  0.88    Ca    9.2      04 Nov 2019 06:55    TPro  7.8  /  Alb  4.4  /  TBili  0.8  /  DBili  x   /  AST  24  /  ALT  20  /  AlkPhos  83  11-03    PT/INR - ( 03 Nov 2019 13:05 )   PT: 12.4 sec;   INR: 1.08 ratio         PTT - ( 04 Nov 2019 01:27 )  PTT:62.4 sec  CARDIAC MARKERS ( 03 Nov 2019 16:53 )  x     / x     / 168 U/L / x     / 7.0 ng/mL  CARDIAC MARKERS ( 03 Nov 2019 13:05 )  x     / x     / 185 U/L / x     / 8.4 ng/mL      Echo: pending      Interpretation of Telemetry: NSR 60-90s Patient seen and examined at bedside.    Overnight Events:   no events overnight. Feels well this AM.  no more CP. denies palpitations, SOB, orthopnea.      REVIEW OF SYSTEMS:  Constitutional:     [x ] negative [ ] fevers [ ] chills [ ] weight loss [ ] weight gain  HEENT:                  [x ] negative [ ] dry eyes [ ] eye irritation [ ] postnasal drip [ ] nasal congestion  CV:                         [ x] negative  [ ] chest pain [ ] orthopnea [ ] palpitations [ ] murmur  Resp:                     [ x] negative [ ] cough [ ] shortness of breath [ ] dyspnea [ ] wheezing [ ] sputum [ ]hemoptysis  GI:                          [ x] negative [ ] nausea [ ] vomiting [ ] diarrhea [ ] constipation [ ] abd pain [ ] dysphagia   :                        [ x] negative [ ] dysuria [ ] nocturia [ ] hematuria [ ] increased urinary frequency  Musculoskeletal: [ x] negative [ ] back pain [ ] myalgias [ ] arthralgias [ ] fracture  Skin:                       [ x] negative [ ] rash [ ] itch  Neurological:        [x ] negative [ ] headache [ ] dizziness [ ] syncope [ ] weakness [ ] numbness  Psychiatric:           [ x] negative [ ] anxiety [ ] depression  Endocrine:            [ x] negative [ ] diabetes [ ] thyroid problem  Heme/Lymph:      [ x] negative [ ] anemia [ ] bleeding problem  Allergic/Immune: [ x] negative [ ] itchy eyes [ ] nasal discharge [ ] hives [ ] angioedema    [ x] All other systems negative    Current Meds:  acetaminophen   Tablet .. 650 milliGRAM(s) Oral every 6 hours PRN  aspirin  chewable 81 milliGRAM(s) Oral daily  heparin  Infusion.  Unit(s)/Hr IV Continuous <Continuous>  heparin  Injectable 4700 Unit(s) IV Push every 6 hours PRN  influenza   Vaccine 0.5 milliLiter(s) IntraMuscular once  metoprolol tartrate 25 milliGRAM(s) Oral two times a day  pantoprazole    Tablet 40 milliGRAM(s) Oral before breakfast      PAST MEDICAL & SURGICAL HISTORY:  Varicose veins  Hyperlipidemia, unspecified hyperlipidemia type  Hypertension, unspecified type  H/O vascular surgery  H/O abdominal hysterectomy      Vitals:  T(F): 98.1 (11-04), Max: 98.9 (11-03)  HR: 67 (11-04) (63 - 74)  BP: 129/72 (11-04) (113/71 - 174/90)  RR: 18 (11-04)  SpO2: 100% (11-04)    Physical Exam:  Appearance: No acute distress; well appearing  Eyes: PERRL, EOMI, pink conjunctiva  HENT: Normal oral mucosa  Cardiovascular: RRR, S1, S2, no murmurs, rubs, or gallops; no edema; no JVD  Respiratory: Clear to auscultation bilaterally  Gastrointestinal: soft, non-tender, non-distended with normal bowel sounds  Musculoskeletal: No clubbing; no joint deformity   Neurologic: Non-focal  Lymphatic: No lymphadenopathy  Psychiatry: AAOx3, mood & affect appropriate  Skin: No rashes, ecchymoses, or cyanosis               LABS             13.7   7.07  )-----------( 376      ( 04 Nov 2019 08:05 )             42.0     11-04  140  |  102  |  14  ----------------------------<  93  3.5   |  25  |  0.88    Ca    9.2      04 Nov 2019 06:55    TPro  7.8  /  Alb  4.4  /  TBili  0.8  /  DBili  x   /  AST  24  /  ALT  20  /  AlkPhos  83  11-03    PT/INR - ( 03 Nov 2019 13:05 )   PT: 12.4 sec;   INR: 1.08 ratio    PTT - ( 04 Nov 2019 01:27 )  PTT:62.4 sec    CARDIAC MARKERS ( 03 Nov 2019 16:53 )  x     / x     / 168 U/L / x     / 7.0 ng/mL  CARDIAC MARKERS ( 03 Nov 2019 13:05 )  x     / x     / 185 U/L / x     / 8.4 ng/mL    Interpretation of Telemetry: NSR 60-90s

## 2019-11-04 NOTE — PROGRESS NOTE ADULT - ASSESSMENT
64 f with    Chest pain/ NSTEMI  - telemetry  - ASA, BB  - AC with Heparin gtt  - cardiology evaluation noted    Dizziness  - CT head    HTN control    DVT/ GI prophylaxis  Kamlesh Cruz MD pager [phone]

## 2019-11-05 ENCOUNTER — TRANSCRIPTION ENCOUNTER (OUTPATIENT)
Age: 64
End: 2019-11-05

## 2019-11-05 VITALS
SYSTOLIC BLOOD PRESSURE: 158 MMHG | OXYGEN SATURATION: 98 % | TEMPERATURE: 98 F | HEART RATE: 82 BPM | DIASTOLIC BLOOD PRESSURE: 81 MMHG | RESPIRATION RATE: 18 BRPM

## 2019-11-05 LAB
ANION GAP SERPL CALC-SCNC: 14 MMOL/L — SIGNIFICANT CHANGE UP (ref 5–17)
APTT BLD: 28 SEC — SIGNIFICANT CHANGE UP (ref 27.5–36.3)
BUN SERPL-MCNC: 18 MG/DL — SIGNIFICANT CHANGE UP (ref 7–23)
CALCIUM SERPL-MCNC: 9.3 MG/DL — SIGNIFICANT CHANGE UP (ref 8.4–10.5)
CHLORIDE SERPL-SCNC: 102 MMOL/L — SIGNIFICANT CHANGE UP (ref 96–108)
CHOLEST SERPL-MCNC: 178 MG/DL — SIGNIFICANT CHANGE UP (ref 10–199)
CO2 SERPL-SCNC: 24 MMOL/L — SIGNIFICANT CHANGE UP (ref 22–31)
CREAT SERPL-MCNC: 1.02 MG/DL — SIGNIFICANT CHANGE UP (ref 0.5–1.3)
GLUCOSE SERPL-MCNC: 98 MG/DL — SIGNIFICANT CHANGE UP (ref 70–99)
HBA1C BLD-MCNC: 5.8 % — HIGH (ref 4–5.6)
HCT VFR BLD CALC: 45.3 % — HIGH (ref 34.5–45)
HDLC SERPL-MCNC: 40 MG/DL — LOW
HGB BLD-MCNC: 14.3 G/DL — SIGNIFICANT CHANGE UP (ref 11.5–15.5)
LIPID PNL WITH DIRECT LDL SERPL: 112 MG/DL — HIGH
MCHC RBC-ENTMCNC: 29 PG — SIGNIFICANT CHANGE UP (ref 27–34)
MCHC RBC-ENTMCNC: 31.6 GM/DL — LOW (ref 32–36)
MCV RBC AUTO: 91.9 FL — SIGNIFICANT CHANGE UP (ref 80–100)
PLATELET # BLD AUTO: 374 K/UL — SIGNIFICANT CHANGE UP (ref 150–400)
POTASSIUM SERPL-MCNC: 3.7 MMOL/L — SIGNIFICANT CHANGE UP (ref 3.5–5.3)
POTASSIUM SERPL-SCNC: 3.7 MMOL/L — SIGNIFICANT CHANGE UP (ref 3.5–5.3)
RBC # BLD: 4.93 M/UL — SIGNIFICANT CHANGE UP (ref 3.8–5.2)
RBC # FLD: 13.6 % — SIGNIFICANT CHANGE UP (ref 10.3–14.5)
SODIUM SERPL-SCNC: 140 MMOL/L — SIGNIFICANT CHANGE UP (ref 135–145)
TOTAL CHOLESTEROL/HDL RATIO MEASUREMENT: 4.5 RATIO — SIGNIFICANT CHANGE UP (ref 3.3–7.1)
TRIGL SERPL-MCNC: 128 MG/DL — SIGNIFICANT CHANGE UP (ref 10–149)
WBC # BLD: 9.01 K/UL — SIGNIFICANT CHANGE UP (ref 3.8–10.5)
WBC # FLD AUTO: 9.01 K/UL — SIGNIFICANT CHANGE UP (ref 3.8–10.5)

## 2019-11-05 PROCEDURE — 99285 EMERGENCY DEPT VISIT HI MDM: CPT | Mod: 25

## 2019-11-05 PROCEDURE — 86803 HEPATITIS C AB TEST: CPT

## 2019-11-05 PROCEDURE — 84484 ASSAY OF TROPONIN QUANT: CPT

## 2019-11-05 PROCEDURE — C1894: CPT

## 2019-11-05 PROCEDURE — 82553 CREATINE MB FRACTION: CPT

## 2019-11-05 PROCEDURE — 93306 TTE W/DOPPLER COMPLETE: CPT | Mod: 26

## 2019-11-05 PROCEDURE — 70450 CT HEAD/BRAIN W/O DYE: CPT

## 2019-11-05 PROCEDURE — 96374 THER/PROPH/DIAG INJ IV PUSH: CPT

## 2019-11-05 PROCEDURE — 90686 IIV4 VACC NO PRSV 0.5 ML IM: CPT

## 2019-11-05 PROCEDURE — 93458 L HRT ARTERY/VENTRICLE ANGIO: CPT

## 2019-11-05 PROCEDURE — 99233 SBSQ HOSP IP/OBS HIGH 50: CPT

## 2019-11-05 PROCEDURE — 82550 ASSAY OF CK (CPK): CPT

## 2019-11-05 PROCEDURE — 93005 ELECTROCARDIOGRAM TRACING: CPT

## 2019-11-05 PROCEDURE — 93306 TTE W/DOPPLER COMPLETE: CPT

## 2019-11-05 PROCEDURE — 85730 THROMBOPLASTIN TIME PARTIAL: CPT

## 2019-11-05 PROCEDURE — C1769: CPT

## 2019-11-05 PROCEDURE — 80053 COMPREHEN METABOLIC PANEL: CPT

## 2019-11-05 PROCEDURE — 80061 LIPID PANEL: CPT

## 2019-11-05 PROCEDURE — 71045 X-RAY EXAM CHEST 1 VIEW: CPT

## 2019-11-05 PROCEDURE — 80048 BASIC METABOLIC PNL TOTAL CA: CPT

## 2019-11-05 PROCEDURE — 85027 COMPLETE CBC AUTOMATED: CPT

## 2019-11-05 PROCEDURE — 83036 HEMOGLOBIN GLYCOSYLATED A1C: CPT

## 2019-11-05 PROCEDURE — 85610 PROTHROMBIN TIME: CPT

## 2019-11-05 PROCEDURE — C1887: CPT

## 2019-11-05 RX ORDER — LISINOPRIL 2.5 MG/1
1 TABLET ORAL
Qty: 30 | Refills: 0
Start: 2019-11-05 | End: 2019-12-04

## 2019-11-05 RX ORDER — TOBRAMYCIN 0.3 %
1 DROPS OPHTHALMIC (EYE) EVERY 4 HOURS
Refills: 0 | Status: DISCONTINUED | OUTPATIENT
Start: 2019-11-05 | End: 2019-11-05

## 2019-11-05 RX ORDER — METOPROLOL TARTRATE 50 MG
1 TABLET ORAL
Qty: 60 | Refills: 0
Start: 2019-11-05 | End: 2019-12-04

## 2019-11-05 RX ORDER — ATORVASTATIN CALCIUM 80 MG/1
1 TABLET, FILM COATED ORAL
Qty: 30 | Refills: 0
Start: 2019-11-05 | End: 2019-12-04

## 2019-11-05 RX ORDER — FUROSEMIDE 40 MG
1 TABLET ORAL
Qty: 30 | Refills: 0
Start: 2019-11-05

## 2019-11-05 RX ORDER — TOBRAMYCIN 0.3 %
1 DROPS OPHTHALMIC (EYE)
Qty: 1 | Refills: 0
Start: 2019-11-05 | End: 2019-11-11

## 2019-11-05 RX ADMIN — Medication 650 MILLIGRAM(S): at 00:03

## 2019-11-05 RX ADMIN — Medication 1 DROP(S): at 13:30

## 2019-11-05 RX ADMIN — Medication 650 MILLIGRAM(S): at 00:33

## 2019-11-05 RX ADMIN — INFLUENZA VIRUS VACCINE 0.5 MILLILITER(S): 15; 15; 15; 15 SUSPENSION INTRAMUSCULAR at 17:23

## 2019-11-05 RX ADMIN — Medication 1 DROP(S): at 17:19

## 2019-11-05 RX ADMIN — PANTOPRAZOLE SODIUM 40 MILLIGRAM(S): 20 TABLET, DELAYED RELEASE ORAL at 05:13

## 2019-11-05 RX ADMIN — Medication 81 MILLIGRAM(S): at 12:23

## 2019-11-05 RX ADMIN — Medication 25 MILLIGRAM(S): at 05:13

## 2019-11-05 RX ADMIN — Medication 25 MILLIGRAM(S): at 17:19

## 2019-11-05 NOTE — PROGRESS NOTE ADULT - ATTENDING COMMENTS
64 year old female with past medical history of HTN, HLD, HCM, presenting with stable angina s/p [facility] non-obstructive CAD. Her symptoms are multifactorial relating to morbid obesity, deconditioning, and the hypertrophic cardiomyopathy. ECHO with mild LV dysfunction EF 47%. Started on metoprolol tartrate 25 mg BID and up-titrate as tolerated. Discharge on lisinopril 2.5 mg daily, atorvastatin 20 mg nightly, furosemide 40 mg daily. No indication of aspirin 81 mg daily. Encourage exercise daily moderate intensity, weight loss, and heart healthy diet. Follow up outpatient. Acceptable for discharge.     Vahe Cotter MD, MPH, JOSÉ, RPVI, FACC  Cardiovascular Specialist   [facility]  c: [phone]  e: [email or web address]

## 2019-11-05 NOTE — PROGRESS NOTE ADULT - ASSESSMENT
64 f with    Chest pain/ NSTEMI  - telemetry  - ASA, BB  - cardiology follow     Dizziness improving   - CT head with no acute findings    HTN control    L eye conjunctivitis  - Tobramycin gtt  - Ophtalmo follow    DVT/ GI prophylaxis    DCP home if cleared by cardiology with follow with PMD / Medical clinic in 3-4 days.  Kamlesh Cruz MD pager [phone]

## 2019-11-05 NOTE — DISCHARGE NOTE PROVIDER - NSDCMRMEDTOKEN_GEN_ALL_CORE_FT
Lipitor 20 mg oral tablet: 1 tab(s) orally once a day  lisinopril 2.5 mg oral tablet: 1 tab(s) orally once a day   metoprolol tartrate 25 mg oral tablet: 1 tab(s) orally 2 times a day  tobramycin 0.3% ophthalmic solution: 1 drop(s) to each affected eye every 4 hours Lasix 40 mg oral tablet: 1 tab(s) orally once a day   Lipitor 20 mg oral tablet: 1 tab(s) orally once a day  lisinopril 2.5 mg oral tablet: 1 tab(s) orally once a day   metoprolol tartrate 25 mg oral tablet: 1 tab(s) orally 2 times a day  tobramycin 0.3% ophthalmic solution: 1 drop(s) to each affected eye every 4 hours

## 2019-11-05 NOTE — PROGRESS NOTE ADULT - SUBJECTIVE AND OBJECTIVE BOX
Patient seen and examined at bedside.    Overnight Events:  No events overnight.      REVIEW OF SYSTEMS:  Constitutional:     [x ] negative [ ] fevers [ ] chills [ ] weight loss [ ] weight gain  HEENT:                  [x ] negative [ ] dry eyes [ ] eye irritation [ ] postnasal drip [ ] nasal congestion  CV:                         [ x] negative  [ ] chest pain [ ] orthopnea [ ] palpitations [ ] murmur  Resp:                     [ x] negative [ ] cough [ ] shortness of breath [ ] dyspnea [ ] wheezing [ ] sputum [ ]hemoptysis  GI:                          [ x] negative [ ] nausea [ ] vomiting [ ] diarrhea [ ] constipation [ ] abd pain [ ] dysphagia   :                        [ x] negative [ ] dysuria [ ] nocturia [ ] hematuria [ ] increased urinary frequency  Musculoskeletal: [ x] negative [ ] back pain [ ] myalgias [ ] arthralgias [ ] fracture  Skin:                       [ x] negative [ ] rash [ ] itch  Neurological:        [x ] negative [ ] headache [ ] dizziness [ ] syncope [ ] weakness [ ] numbness  Psychiatric:           [ x] negative [ ] anxiety [ ] depression  Endocrine:            [ x] negative [ ] diabetes [ ] thyroid problem  Heme/Lymph:      [ x] negative [ ] anemia [ ] bleeding problem  Allergic/Immune: [ x] negative [ ] itchy eyes [ ] nasal discharge [ ] hives [ ] angioedema    [ x] All other systems negative  [ ] Unable to assess ROS due to    Current Meds:  acetaminophen   Tablet .. 650 milliGRAM(s) Oral every 6 hours PRN  aspirin  chewable 81 milliGRAM(s) Oral daily  influenza   Vaccine 0.5 milliLiter(s) IntraMuscular once  metoprolol tartrate 25 milliGRAM(s) Oral two times a day  pantoprazole    Tablet 40 milliGRAM(s) Oral before breakfast      PAST MEDICAL & SURGICAL HISTORY:  Varicose veins  Hyperlipidemia, unspecified hyperlipidemia type  Hypertension, unspecified type  H/O vascular surgery  H/O abdominal hysterectomy      Vitals:  T(F): 97.8 (11-05), Max: 98.8 (11-04)  HR: 71 (11-05) (67 - 75)  BP: 111/71 (11-05) (111/71 - 153/93)  RR: 18 (11-05)  SpO2: 98% (11-05)  I&O's Summary    04 Nov 2019 07:01  -  05 Nov 2019 07:00  --------------------------------------------------------  IN: 397 mL / OUT: 0 mL / NET: 397 mL        Physical Exam:  Appearance: No acute distress; well appearing  Eyes: PERRL, EOMI, pink conjunctiva  HENT: Normal oral mucosa  Cardiovascular: RRR, S1, S2, no murmurs, rubs, or gallops; no edema; no JVD  Respiratory: Clear to auscultation bilaterally  Gastrointestinal: soft, non-tender, non-distended with normal bowel sounds  Musculoskeletal: No clubbing; no joint deformity   Neurologic: Non-focal  Lymphatic: No lymphadenopathy  Psychiatry: AAOx3, mood & affect appropriate  Skin: No rashes, ecchymoses, or cyanosis  ext: right radial pulse 2+, minimal ecchymosis, nobruit, sensation intact                          13.7   7.07  )-----------( 376      ( 04 Nov 2019 08:05 )             42.0     11-05    140  |  102  |  18  ----------------------------<  98  3.7   |  24  |  1.02    Ca    9.3      05 Nov 2019 06:05    TPro  7.8  /  Alb  4.4  /  TBili  0.8  /  DBili  x   /  AST  24  /  ALT  20  /  AlkPhos  83  11-03    PT/INR - ( 03 Nov 2019 13:05 )   PT: 12.4 sec;   INR: 1.08 ratio         PTT - ( 04 Nov 2019 01:27 )  PTT:62.4 sec  CARDIAC MARKERS ( 03 Nov 2019 16:53 )  x     / x     / 168 U/L / x     / 7.0 ng/mL  CARDIAC MARKERS ( 03 Nov 2019 13:05 )  x     / x     / 185 U/L / x     / 8.4 ng/mL        Interpretation of Telemetry: sinus 60-80s Patient seen and examined at bedside.    Overnight Events:  No events overnight.    REVIEW OF SYSTEMS:  Constitutional:     [x ] negative [ ] fevers [ ] chills [ ] weight loss [ ] weight gain  HEENT:                  [x ] negative [ ] dry eyes [ ] eye irritation [ ] postnasal drip [ ] nasal congestion  CV:                         [ x] negative  [ ] chest pain [ ] orthopnea [ ] palpitations [ ] murmur  Resp:                     [ x] negative [ ] cough [ ] shortness of breath [ ] dyspnea [ ] wheezing [ ] sputum [ ]hemoptysis  GI:                          [ x] negative [ ] nausea [ ] vomiting [ ] diarrhea [ ] constipation [ ] abd pain [ ] dysphagia   :                        [ x] negative [ ] dysuria [ ] nocturia [ ] hematuria [ ] increased urinary frequency  Musculoskeletal: [ x] negative [ ] back pain [ ] myalgias [ ] arthralgias [ ] fracture  Skin:                       [ x] negative [ ] rash [ ] itch  Neurological:        [x ] negative [ ] headache [ ] dizziness [ ] syncope [ ] weakness [ ] numbness  Psychiatric:           [ x] negative [ ] anxiety [ ] depression  Endocrine:            [ x] negative [ ] diabetes [ ] thyroid problem  Heme/Lymph:      [ x] negative [ ] anemia [ ] bleeding problem  Allergic/Immune: [ x] negative [ ] itchy eyes [ ] nasal discharge [ ] hives [ ] angioedema    [ x] All other systems negative  [ ] Unable to assess ROS due to    Current Meds:  acetaminophen   Tablet .. 650 milliGRAM(s) Oral every 6 hours PRN  aspirin  chewable 81 milliGRAM(s) Oral daily  influenza   Vaccine 0.5 milliLiter(s) IntraMuscular once  metoprolol tartrate 25 milliGRAM(s) Oral two times a day  pantoprazole    Tablet 40 milliGRAM(s) Oral before breakfast      PAST MEDICAL & SURGICAL HISTORY:  Varicose veins  Hyperlipidemia, unspecified hyperlipidemia type  Hypertension, unspecified type  H/O vascular surgery  H/O abdominal hysterectomy      Vitals:  T(F): 97.8 (11-05), Max: 98.8 (11-04)  HR: 71 (11-05) (67 - 75)  BP: 111/71 (11-05) (111/71 - 153/93)  RR: 18 (11-05)  SpO2: 98% (11-05)  I&O's Summary    04 Nov 2019 07:01  -  05 Nov 2019 07:00  --------------------------------------------------------  IN: 397 mL / OUT: 0 mL / NET: 397 mL        Physical Exam:  Appearance: No acute distress; well appearing  Eyes: PERRL, EOMI, pink conjunctiva  HENT: Normal oral mucosa  Cardiovascular: RRR, S1, S2, no murmurs, rubs, or gallops; no edema; no JVD  Respiratory: Clear to auscultation bilaterally  Gastrointestinal: soft, non-tender, non-distended with normal bowel sounds  Musculoskeletal: No clubbing; no joint deformity   Neurologic: Non-focal  Lymphatic: No lymphadenopathy  Psychiatry: AAOx3, mood & affect appropriate  Skin: No rashes, ecchymoses, or cyanosis  ext: right radial pulse 2+, minimal ecchymosis, nobruit, sensation intact                          13.7   7.07  )-----------( 376      ( 04 Nov 2019 08:05 )             42.0     11-05    140  |  102  |  18  ----------------------------<  98  3.7   |  24  |  1.02    Ca    9.3      05 Nov 2019 06:05    TPro  7.8  /  Alb  4.4  /  TBili  0.8  /  DBili  x   /  AST  24  /  ALT  20  /  AlkPhos  83  11-03    PT/INR - ( 03 Nov 2019 13:05 )   PT: 12.4 sec;   INR: 1.08 ratio         PTT - ( 04 Nov 2019 01:27 )  PTT:62.4 sec  CARDIAC MARKERS ( 03 Nov 2019 16:53 )  x     / x     / 168 U/L / x     / 7.0 ng/mL  CARDIAC MARKERS ( 03 Nov 2019 13:05 )  x     / x     / 185 U/L / x     / 8.4 ng/mL    Interpretation of Telemetry: sinus 60-80s

## 2019-11-05 NOTE — CHART NOTE - NSCHARTNOTEFT_GEN_A_CORE
MEDICINE NP NOTE  Spectra [phone]    Case discussed with Dr Cruz and Dr Cotter who both cleared patient for discharge to home.

## 2019-11-05 NOTE — PROGRESS NOTE ADULT - ASSESSMENT
64F hx HTN, HLD, non-smoker and cardiomegaly (per verbal report) p/w chest pain, concerning for unstable angina, with mild elevated of troponin and CK-MB.    #chest pain  -initial concern for UA, however only luminal irreg on LHC  -c/w ASA for now  -will f/u TTE  -c/w metoprolol 25mg BID  -may have microvascular dz, please stratify w/ lipid panel, A1c  -high intensity statin      Will discuss w/ attending  Angel Morin MD  Cardiology Fellow - PGY 4  Text or Call: [phone] 64F hx HTN, HLD, non-smoker and cardiomegaly (per verbal report) p/w chest pain, concerning for unstable angina, with mild elevated of troponin and CK-MB.     #chest pain  -initial concern for UA, however only luminal irreg on LHC  -c/w ASA for now  -will f/u TTE  -c/w metoprolol 25mg BID  -may have microvascular d, please stratify w/ lipid panel, A1c  -high intensity statin    Will discuss w/ attending  Angel Morin MD  Cardiology Fellow - PGY 4  Text or Call: [phone]

## 2019-11-05 NOTE — DISCHARGE NOTE PROVIDER - NSFOLLOWUPCLINICS_GEN_ALL_ED_FT
[facility]  Cardiology  [address] [place] [id number]  Phone: [phone]  Fax:   Follow Up Time: 1 week

## 2019-11-05 NOTE — DISCHARGE NOTE PROVIDER - HOSPITAL COURSE
64 year old female PMH HTN, HLD, non-smoker and cardiomegaly (per verbal report) admitted with chest pain, concerning for unstable angina, with mild elevated of troponin and CK-MB.  Chest pain initially concerning for unstable angina.  Patient underwent cardiac cath which showed only luminal irregularities.  Echocardiogram with EF 47%, Minimal mitral regurgitation. Asymmetric septal and apical hypertrophy. Mild global left ventricular systolic dysfunction. Mild diastolic dysfunction (Stage I). Results suggestive of HCM. Consider cMRI for further evaluation.  No further cardiac interventions at this time as per cardiology.  Medications adjusted as per recommendations.  Patient stable for discharge.  Patient lives in [place] [facility], will likely follow up with her cardiologist there, cardiology clinic information provided.

## 2019-11-05 NOTE — DISCHARGE NOTE NURSING/CASE MANAGEMENT/SOCIAL WORK - PATIENT PORTAL LINK FT
You can access the FollowMyHealth Patient Portal offered by [facility] by registering at the following website: [email or web address]. By joining [facility]’s FollowMyHealth portal, you will also be able to view your health information using other applications (apps) compatible with our system.

## 2019-11-05 NOTE — PROGRESS NOTE ADULT - SUBJECTIVE AND OBJECTIVE BOX
Patient is a 64y old  Female who presents with a chief complaint of chest pain (05 Nov 2019 07:43)      SUBJECTIVE / OVERNIGHT EVENTS: Feels better. c/o L eye secretion.  Review of Systems  chest pain no  palpitations no  sob no  nausea no  headache no    MEDICATIONS  (STANDING):  influenza   Vaccine 0.5 milliLiter(s) IntraMuscular once  metoprolol tartrate 25 milliGRAM(s) Oral two times a day  pantoprazole    Tablet 40 milliGRAM(s) Oral before breakfast  tobramycin 0.3% Ophthalmic Solution 1 Drop(s) Left EYE every 4 hours    MEDICATIONS  (PRN):  acetaminophen   Tablet .. 650 milliGRAM(s) Oral every 6 hours PRN Temp greater or equal to 38.5C (101.3F), Mild Pain (1 - 3)      Vital Signs Last 24 Hrs  T(C): 37 (05 Nov 2019 11:08), Max: 37 (05 Nov 2019 11:08)  T(F): 98.6 (05 Nov 2019 11:08), Max: 98.6 (05 Nov 2019 11:08)  HR: 72 (05 Nov 2019 11:08) (70 - 75)  BP: 127/75 (05 Nov 2019 11:08) (111/71 - 153/93)  BP(mean): --  RR: 18 (05 Nov 2019 11:08) (18 - 18)  SpO2: 98% (05 Nov 2019 11:08) (96% - 99%)    PHYSICAL EXAM:  GENERAL: NAD, well-developed  HEAD:  Atraumatic, Normocephalic  EYES: EOMI, PERRLA, L eye conjunctiva mild erythema  NECK: Supple, No JVD  CHEST/LUNG: Clear to auscultation bilaterally; No wheeze  HEART: Regular rate and rhythm; No murmurs, rubs, or gallops  ABDOMEN: Soft, Nontender, Nondistended; Bowel sounds present  EXTREMITIES:  2+ Peripheral Pulses, No clubbing, cyanosis, or edema  PSYCH: AAOx3  NEUROLOGY: non-focal  SKIN: No rashes or lesions    LABS:                        14.3   9.01  )-----------( 374      ( 05 Nov 2019 08:29 )             45.3     11-05    140  |  102  |  18  ----------------------------<  98  3.7   |  24  |  1.02    Ca    9.3      05 Nov 2019 06:05      PTT - ( 05 Nov 2019 10:01 )  PTT:28.0 sec  CARDIAC MARKERS ( 03 Nov 2019 16:53 )  x     / x     / 168 U/L / x     / 7.0 ng/mL            RADIOLOGY & ADDITIONAL TESTS:    Imaging Personally Reviewed:    Consultant(s) Notes Reviewed:      Care Discussed with Consultants/Other Providers:

## 2019-11-05 NOTE — DISCHARGE NOTE PROVIDER - NSDCCPCAREPLAN_GEN_ALL_CORE_FT
PRINCIPAL DISCHARGE DIAGNOSIS  Diagnosis: Chest pain  Assessment and Plan of Treatment: Take all your medications as prescribed by your physician.  Follow up with your outpatient cardiologist in [facility], if you are still in the [place], you can follow up with the cardiology clinic.   If you develop chest pain or shortness of breath, please go to your nearest emergency room and contact your physician.      SECONDARY DISCHARGE DIAGNOSES  Diagnosis: Hypertension  Assessment and Plan of Treatment: Low salt diet  Activity as tolerated.  Take all medication as prescribed.  Follow up with your medical doctor for routine blood pressure monitoring at your next visit.  Notify your doctor if you have any of the following symptoms:   Dizziness, Lightheadedness, Blurry vision, Headache, Chest pain, Shortness of breath

## 2019-11-13 PROBLEM — I10 ESSENTIAL (PRIMARY) HYPERTENSION: Chronic | Status: ACTIVE | Noted: 2019-11-03

## 2019-11-13 PROBLEM — I83.90 ASYMPTOMATIC VARICOSE VEINS OF UNSPECIFIED LOWER EXTREMITY: Chronic | Status: ACTIVE | Noted: 2019-11-03

## 2019-11-13 PROBLEM — E78.5 HYPERLIPIDEMIA, UNSPECIFIED: Chronic | Status: ACTIVE | Noted: 2019-11-03

## 2019-11-20 PROBLEM — Z00.00 ENCOUNTER FOR PREVENTIVE HEALTH EXAMINATION: Status: ACTIVE | Noted: 2019-11-20

## 2019-11-21 ENCOUNTER — APPOINTMENT (OUTPATIENT)
Dept: CARDIOLOGY | Facility: HOSPITAL | Age: 64
End: 2019-11-21

## 2019-11-21 ENCOUNTER — OUTPATIENT (OUTPATIENT)
Dept: OUTPATIENT SERVICES | Facility: HOSPITAL | Age: 64
LOS: 1 days | End: 2019-11-21
Payer: SELF-PAY

## 2019-11-21 VITALS
SYSTOLIC BLOOD PRESSURE: 135 MMHG | WEIGHT: 174.16 LBS | HEART RATE: 77 BPM | OXYGEN SATURATION: 98 % | BODY MASS INDEX: 27.99 KG/M2 | HEIGHT: 66 IN | DIASTOLIC BLOOD PRESSURE: 80 MMHG

## 2019-11-21 DIAGNOSIS — Z90.710 ACQUIRED ABSENCE OF BOTH CERVIX AND UTERUS: Chronic | ICD-10-CM

## 2019-11-21 DIAGNOSIS — Z86.79 PERSONAL HISTORY OF OTHER DISEASES OF THE CIRCULATORY SYSTEM: ICD-10-CM

## 2019-11-21 DIAGNOSIS — I25.10 ATHEROSCLEROTIC HEART DISEASE OF NATIVE CORONARY ARTERY WITHOUT ANGINA PECTORIS: ICD-10-CM

## 2019-11-21 DIAGNOSIS — I42.2 OTHER HYPERTROPHIC CARDIOMYOPATHY: ICD-10-CM

## 2019-11-21 DIAGNOSIS — I10 ESSENTIAL (PRIMARY) HYPERTENSION: ICD-10-CM

## 2019-11-21 DIAGNOSIS — Z98.890 OTHER SPECIFIED POSTPROCEDURAL STATES: Chronic | ICD-10-CM

## 2019-11-21 PROCEDURE — 93005 ELECTROCARDIOGRAM TRACING: CPT

## 2019-11-21 PROCEDURE — G0463: CPT

## 2019-11-22 PROBLEM — Z86.79 HISTORY OF HYPERTENSION: Status: RESOLVED | Noted: 2019-11-22 | Resolved: 2019-11-22

## 2019-11-22 PROBLEM — I10 HTN (HYPERTENSION): Status: ACTIVE | Noted: 2019-11-22

## 2019-11-22 PROBLEM — I42.2 HYPERTROPHIC CARDIOMYOPATHY: Status: ACTIVE | Noted: 2019-11-21

## 2019-11-22 NOTE — REASON FOR VISIT
[FreeTextEntry1] : The Pt is a 65 y/o woman with h/o HTN, "Thick Heart" who presented to an OSH with exertional dyspnea and chest discomfort in 2019. Troponin was borderline and she was transferred to [facility] for LHC, which did not reveal evidence of Obstructive CAD. She underwent TTE which revealed Asymmetric Septal Hypertrophy (Septum ~ 1.7 cm), no LVOT gradient was identified, and EF was ~ 50%. She was discharged on BB, ACE-I, and diuretic, though it is unclear why the diuretic was initiated given that there was not clear evidence of volume overload. On interview, she appears to be aware that she likely has HCM, though was not familiar with the name of this diagnosis. She also reports that her nephew  suddenly about 18 years old after playing basketball, and thinks that this was attributed to Heart Disease. She has a son whom she thinks may have a similar issue. She denies any episodes of syncope. \par \par EKG: Sinus Rhythm with Strain Pattern \par \par \par LHC 2019\par \par CORONARY VESSELS: The coronary circulation is right dominant.\par LM:   --  LM: Normal.\par LAD:   --  LAD: The vessel was moderately tortuous and moderately ectatic.\par CX:   --  Circumflex: The vessel was moderately tortuous and moderately\par ectatic.\par RCA:   --  RCA: The vessel was moderately tortuous and moderately ectatic.\par COMPLICATIONS: There were no complications.\par \par TTE 2019:\par \par Dimensions:    Normal Values:\par LA:     3.1    2.0 - 4.0 cm\par Ao:     2.8    2.0 - 3.8 cm\par SEPTUM: 1.7    0.6 - 1.2 cm\par PWT:    1.0    0.6 - 1.1 cm\par LVIDd:  4.7    3.0 - 5.6 cm\par LVIDs:  3.6    1.8 - 4.0 cm\par Derived variables:\par LVMI: 133 g/m2\par RWT: 0.42\par Fractional short: 23 %\par EF (Main Rule): 47 %Doppler Peak Velocity (m/sec):\par AoV=1.3\par ------------------------------------------------------------------------\par Observations:\par Mitral Valve: Elongated anterior mitral valve leaflet.  No\par systolic anterior motion of the mitral valve leaflet.\par Minimal mitral regurgitation.\par Aortic Valve/Aorta: Normal trileaflet aortic valve. Peak\par transaortic valve gradient equals 7 mm Hg. No aortic valve\par regurgitation seen. Peak left ventricular outflow tract\par gradient equals 4 mm Hg.\par Aortic Root: 2.8 cm.\par Left Atrium: Normal left atrium.  LA volume index = 23\par cc/m2.\par Left Ventricle: Mild global left ventricular systolic\par dysfunction. Asymmetric septal and apical hypertrophy. Mild\par diastolic dysfunction (Stage I).\par Right Heart: Normal right atrium. Normal right ventricular\par size and function. Normal tricuspid valve. Minimal\par tricuspid regurgitation. Normal pulmonic valve. Minimal\par pulmonic regurgitation.\par Pericardium/Pleura: Normal pericardium with no pericardial\par effusion.\par Hemodynamic: Color Doppler demonstrates no evidence of a\par patent foramen ovale.\par ------------------------------------------------------------------------\par Conclusions:\par 1. Elongated anterior mitral valve leaflet.  No systolic\par anterior motion of the mitral valve leaflet. Minimal mitral\par regurgitation.\par 2. Normal trileaflet aortic valve. No aortic valve\par regurgitation seen.\par 3. Asymmetric septal and apical hypertrophy.\par 4. Mild global left ventricular systolic dysfunction.\par Peak left ventricular outflow tract gradient equals 4 mm\par Hg.\par 5. Mild diastolic dysfunction (Stage I).\par 6. Normal right ventricular size and function.\par 7. Normal pericardium with no pericardial effusion.\par *** No previous Echo exam.\par Results suggestive of HCM. Consider cMRI for further\par evaluation.

## 2019-11-22 NOTE — ASSESSMENT
[FreeTextEntry1] : \par 1) Likely HCM - given TTE findings and Family History, very high suspicion for Familial HCM \par - she appears to have a relatively lower risk for Sudden Death given lack of Syncope, Septal Thickness, and no clear history of Arrhythmia, and no identified LVOT Obstruction, but does have Family History of Sudden Death (though not in first degree family member), ideally this would be further evaluated with Cardiac MRI and Stress Echo to look for dynamic gradient, but given that this was a courtesy visit (she is uninsured and lives in [facility]), these studies cannot be feasibly done here as an outpatient \par - she was advised to seek follow up when she returns home, and for her family members to be screened for HCM \par - she had been prescribed Furosemide 40 mg PO Daily during her hospitalization and there does not appear to be compelling evidence to support continuing this medical therapy given her potential propensity for preload sensitivity, so it was stopped \par - Metoprolol and low-dose Lisinopril were continued (the apparent severity of HTN does not appear to be enough to explain the LVH), without more aggressive titration given risk of overly aggressive afterload reduction

## 2019-11-25 DIAGNOSIS — I42.2 OTHER HYPERTROPHIC CARDIOMYOPATHY: ICD-10-CM

## 2019-11-25 DIAGNOSIS — I10 ESSENTIAL (PRIMARY) HYPERTENSION: ICD-10-CM

## 2020-05-07 RX ORDER — METOPROLOL SUCCINATE 50 MG/1
50 TABLET, EXTENDED RELEASE ORAL DAILY
Qty: 90 | Refills: 3 | Status: ACTIVE | COMMUNITY
Start: 2019-11-21 | End: 1900-01-01

## 2020-05-07 RX ORDER — ATORVASTATIN CALCIUM 20 MG/1
20 TABLET, FILM COATED ORAL
Qty: 90 | Refills: 3 | Status: ACTIVE | COMMUNITY
Start: 2019-11-21 | End: 1900-01-01

## 2020-05-07 RX ORDER — LISINOPRIL 2.5 MG/1
2.5 TABLET ORAL DAILY
Qty: 90 | Refills: 3 | Status: ACTIVE | COMMUNITY
Start: 2019-11-21 | End: 1900-01-01

## 2023-07-20 NOTE — ED PROVIDER NOTE - CRITICAL CARE PROVIDED
documentation/consultation with other physicians/direct patient care (not related to procedure)/additional history taking/interpretation of diagnostic studies IV discontinued, cath removed intact